# Patient Record
Sex: MALE | Race: WHITE | NOT HISPANIC OR LATINO | URBAN - METROPOLITAN AREA
[De-identification: names, ages, dates, MRNs, and addresses within clinical notes are randomized per-mention and may not be internally consistent; named-entity substitution may affect disease eponyms.]

---

## 2019-05-28 ENCOUNTER — EMERGENCY (EMERGENCY)
Facility: HOSPITAL | Age: 39
LOS: 1 days | Discharge: HOME | End: 2019-05-28
Attending: EMERGENCY MEDICINE | Admitting: EMERGENCY MEDICINE
Payer: COMMERCIAL

## 2019-05-28 VITALS
TEMPERATURE: 98 F | OXYGEN SATURATION: 93 % | SYSTOLIC BLOOD PRESSURE: 135 MMHG | DIASTOLIC BLOOD PRESSURE: 87 MMHG | RESPIRATION RATE: 16 BRPM | HEART RATE: 62 BPM

## 2019-05-28 DIAGNOSIS — M54.5 LOW BACK PAIN: ICD-10-CM

## 2019-05-28 DIAGNOSIS — Z79.1 LONG TERM (CURRENT) USE OF NON-STEROIDAL ANTI-INFLAMMATORIES (NSAID): ICD-10-CM

## 2019-05-28 DIAGNOSIS — Z79.899 OTHER LONG TERM (CURRENT) DRUG THERAPY: ICD-10-CM

## 2019-05-28 DIAGNOSIS — Z79.891 LONG TERM (CURRENT) USE OF OPIATE ANALGESIC: ICD-10-CM

## 2019-05-28 PROCEDURE — 99218: CPT

## 2019-05-28 PROCEDURE — 72131 CT LUMBAR SPINE W/O DYE: CPT | Mod: 26

## 2019-05-28 RX ORDER — OXYCODONE HYDROCHLORIDE 5 MG/1
2 TABLET ORAL
Qty: 40 | Refills: 0
Start: 2019-05-28 | End: 2019-06-01

## 2019-05-28 RX ORDER — DIAZEPAM 5 MG
2 TABLET ORAL
Qty: 20 | Refills: 0
Start: 2019-05-28 | End: 2019-06-01

## 2019-05-28 RX ORDER — HYDROMORPHONE HYDROCHLORIDE 2 MG/ML
1 INJECTION INTRAMUSCULAR; INTRAVENOUS; SUBCUTANEOUS ONCE
Refills: 0 | Status: DISCONTINUED | OUTPATIENT
Start: 2019-05-28 | End: 2019-05-28

## 2019-05-28 RX ORDER — HYDROMORPHONE HYDROCHLORIDE 2 MG/ML
0.5 INJECTION INTRAMUSCULAR; INTRAVENOUS; SUBCUTANEOUS ONCE
Refills: 0 | Status: DISCONTINUED | OUTPATIENT
Start: 2019-05-28 | End: 2019-05-28

## 2019-05-28 RX ORDER — KETOROLAC TROMETHAMINE 30 MG/ML
30 SYRINGE (ML) INJECTION ONCE
Refills: 0 | Status: DISCONTINUED | OUTPATIENT
Start: 2019-05-28 | End: 2019-05-28

## 2019-05-28 RX ORDER — SODIUM CHLORIDE 9 MG/ML
1000 INJECTION INTRAMUSCULAR; INTRAVENOUS; SUBCUTANEOUS ONCE
Refills: 0 | Status: COMPLETED | OUTPATIENT
Start: 2019-05-28 | End: 2019-05-28

## 2019-05-28 RX ORDER — IBUPROFEN 200 MG
1 TABLET ORAL
Qty: 30 | Refills: 0
Start: 2019-05-28

## 2019-05-28 RX ORDER — DIAZEPAM 5 MG
5 TABLET ORAL ONCE
Refills: 0 | Status: DISCONTINUED | OUTPATIENT
Start: 2019-05-28 | End: 2019-05-28

## 2019-05-28 RX ORDER — DEXAMETHASONE 0.5 MG/5ML
10 ELIXIR ORAL ONCE
Refills: 0 | Status: COMPLETED | OUTPATIENT
Start: 2019-05-28 | End: 2019-05-28

## 2019-05-28 RX ORDER — DIPHENHYDRAMINE HCL 50 MG
50 CAPSULE ORAL ONCE
Refills: 0 | Status: COMPLETED | OUTPATIENT
Start: 2019-05-28 | End: 2019-05-28

## 2019-05-28 RX ADMIN — Medication 5 MILLIGRAM(S): at 19:48

## 2019-05-28 RX ADMIN — Medication 50 MILLIGRAM(S): at 17:15

## 2019-05-28 RX ADMIN — Medication 5 MILLIGRAM(S): at 17:15

## 2019-05-28 RX ADMIN — HYDROMORPHONE HYDROCHLORIDE 0.5 MILLIGRAM(S): 2 INJECTION INTRAMUSCULAR; INTRAVENOUS; SUBCUTANEOUS at 19:48

## 2019-05-28 RX ADMIN — HYDROMORPHONE HYDROCHLORIDE 0.5 MILLIGRAM(S): 2 INJECTION INTRAMUSCULAR; INTRAVENOUS; SUBCUTANEOUS at 20:16

## 2019-05-28 RX ADMIN — Medication 10 MILLIGRAM(S): at 17:16

## 2019-05-28 RX ADMIN — SODIUM CHLORIDE 1000 MILLILITER(S): 9 INJECTION INTRAMUSCULAR; INTRAVENOUS; SUBCUTANEOUS at 17:15

## 2019-05-28 RX ADMIN — HYDROMORPHONE HYDROCHLORIDE 1 MILLIGRAM(S): 2 INJECTION INTRAMUSCULAR; INTRAVENOUS; SUBCUTANEOUS at 18:52

## 2019-05-28 RX ADMIN — Medication 30 MILLIGRAM(S): at 17:16

## 2019-05-28 RX ADMIN — HYDROMORPHONE HYDROCHLORIDE 1 MILLIGRAM(S): 2 INJECTION INTRAMUSCULAR; INTRAVENOUS; SUBCUTANEOUS at 17:16

## 2019-05-28 NOTE — ED PROVIDER NOTE - OBJECTIVE STATEMENT
38 y o male with no PMHx was BIBA for lower back pain and stiffness. States he first felt his back twinge while getting up from a chair this morning. However, while sitting on the toilet at work, pt felt his back tighten and lock. Notes difficulty moving secondary to pain. Pt's work friends were able to help him up. States that he had similar back pain 1.5 years ago, but that it was worse then because it happened for the first time. Pt was treated with antiinflammatories and PT since Percocet, Valium, and Dilaudid did not help. Pt took some muscle relaxers with no relief PTA. No numbness, tingling, bladder dysfunction, or other sx.

## 2019-05-28 NOTE — ED PROVIDER NOTE - CONSTITUTIONAL, MLM
normal... Well appearing, well nourished, awake, alert, oriented to person, place, time/situation and lying flat and still on hospital bed.

## 2019-05-28 NOTE — ED PROVIDER NOTE - MUSCULOSKELETAL, MLM
+Back spasm induced with leg extension. +Diffuse lower lumbar soreness and muscle spasm. Pt unable to roll for full spinal assessment..

## 2019-05-28 NOTE — ED CDU PROVIDER INITIAL DAY NOTE - DETAILS
received from Dr. Pedraza.  Pain slightly improved but persists with very limited mobility.  Denies saddle anesthesia or recent change in bowel or bladder continence patterns.  Denies travel history, fever/chills, recent trauma.  Denies radiation of pain or extremity weakness.  motor exam limited due to pain but symmetric strength.  Imaging ordered and will continue to observe and treat pain prn.

## 2019-05-28 NOTE — ED PROVIDER NOTE - CLINICAL SUMMARY MEDICAL DECISION MAKING FREE TEXT BOX
Pt presents with lower back stiffness and pain. Unknown initial aggravation. On exam back spasm induced with leg extension, diffuse lower lumbar soreness and muscle spasm. Pt unable to roll for full spinal assessment. No sensory deficits. Will give Valium, Dilaudid, antiinflammatories, and steroids for symptomatic relief. Reassess.

## 2019-05-28 NOTE — ED PROVIDER NOTE - NSFOLLOWUPINSTRUCTIONS_ED_ALL_ED_FT
Gentle stretching.  Follow up with your PMD.  Dr. Faith at [facility] is an excellent local option for high quality physical medicine and PT.     Return to the ER for increased pain.

## 2019-05-28 NOTE — ED PROVIDER NOTE - PROGRESS NOTE DETAILS
Patient is a little better, wife on her way. Will give another dose of Dilaudid and additional Po Valium. Is now able to move legs side to side. Can't sit up. This is less severe than his episode last year. received from Dr. Pedraza.  Pain slightly improved but persists with very limited mobility.  Denies saddle anesthesia or recent change in bowel or bladder continence patterns.  Denies travel history, fever/chills, recent trauma.  Denies radiation of pain or extremity weakness.  motor exam limited due to pain but symmetric strength.  Imaging ordered and will continue to observe and treat pain prn.

## 2019-05-29 VITALS
SYSTOLIC BLOOD PRESSURE: 124 MMHG | DIASTOLIC BLOOD PRESSURE: 82 MMHG | HEART RATE: 53 BPM | OXYGEN SATURATION: 98 % | TEMPERATURE: 98 F | RESPIRATION RATE: 18 BRPM

## 2019-05-29 LAB
ALBUMIN SERPL ELPH-MCNC: 3.7 G/DL — SIGNIFICANT CHANGE UP (ref 3.4–5)
ALP SERPL-CCNC: 50 U/L — SIGNIFICANT CHANGE UP (ref 40–120)
ALT FLD-CCNC: 44 U/L — HIGH (ref 12–42)
ANION GAP SERPL CALC-SCNC: 7 MMOL/L — LOW (ref 9–16)
AST SERPL-CCNC: 22 U/L — SIGNIFICANT CHANGE UP (ref 15–37)
BILIRUB SERPL-MCNC: 0.6 MG/DL — SIGNIFICANT CHANGE UP (ref 0.2–1.2)
BUN SERPL-MCNC: 14 MG/DL — SIGNIFICANT CHANGE UP (ref 7–23)
CALCIUM SERPL-MCNC: 9.3 MG/DL — SIGNIFICANT CHANGE UP (ref 8.5–10.5)
CHLORIDE SERPL-SCNC: 109 MMOL/L — HIGH (ref 96–108)
CO2 SERPL-SCNC: 28 MMOL/L — SIGNIFICANT CHANGE UP (ref 22–31)
CREAT SERPL-MCNC: 0.87 MG/DL — SIGNIFICANT CHANGE UP (ref 0.5–1.3)
GLUCOSE SERPL-MCNC: 95 MG/DL — SIGNIFICANT CHANGE UP (ref 70–99)
HCT VFR BLD CALC: 44.5 % — SIGNIFICANT CHANGE UP (ref 39–50)
HGB BLD-MCNC: 15 G/DL — SIGNIFICANT CHANGE UP (ref 13–17)
MCHC RBC-ENTMCNC: 26.8 PG — LOW (ref 27–34)
MCHC RBC-ENTMCNC: 33.7 G/DL — SIGNIFICANT CHANGE UP (ref 32–36)
MCV RBC AUTO: 79.6 FL — LOW (ref 80–100)
PLATELET # BLD AUTO: 163 K/UL — SIGNIFICANT CHANGE UP (ref 150–400)
POTASSIUM SERPL-MCNC: 4.5 MMOL/L — SIGNIFICANT CHANGE UP (ref 3.5–5.3)
POTASSIUM SERPL-SCNC: 4.5 MMOL/L — SIGNIFICANT CHANGE UP (ref 3.5–5.3)
PROT SERPL-MCNC: 7.1 G/DL — SIGNIFICANT CHANGE UP (ref 6.4–8.2)
RBC # BLD: 5.59 M/UL — SIGNIFICANT CHANGE UP (ref 4.2–5.8)
RBC # FLD: 12.9 % — SIGNIFICANT CHANGE UP (ref 10.3–14.5)
SODIUM SERPL-SCNC: 144 MMOL/L — SIGNIFICANT CHANGE UP (ref 132–145)
WBC # BLD: 10.7 K/UL — HIGH (ref 3.8–10.5)
WBC # FLD AUTO: 10.7 K/UL — HIGH (ref 3.8–10.5)

## 2019-05-29 PROCEDURE — 99217: CPT

## 2019-05-29 RX ORDER — METHOCARBAMOL 500 MG/1
1000 TABLET, FILM COATED ORAL ONCE
Refills: 0 | Status: COMPLETED | OUTPATIENT
Start: 2019-05-29 | End: 2019-05-29

## 2019-05-29 RX ORDER — KETOROLAC TROMETHAMINE 30 MG/ML
30 SYRINGE (ML) INJECTION ONCE
Refills: 0 | Status: DISCONTINUED | OUTPATIENT
Start: 2019-05-29 | End: 2019-05-29

## 2019-05-29 RX ORDER — IBUPROFEN 200 MG
400 TABLET ORAL ONCE
Refills: 0 | Status: COMPLETED | OUTPATIENT
Start: 2019-05-29 | End: 2019-05-29

## 2019-05-29 RX ORDER — HYDROMORPHONE HYDROCHLORIDE 2 MG/ML
1 INJECTION INTRAMUSCULAR; INTRAVENOUS; SUBCUTANEOUS ONCE
Refills: 0 | Status: DISCONTINUED | OUTPATIENT
Start: 2019-05-29 | End: 2019-05-29

## 2019-05-29 RX ORDER — TRAMADOL HYDROCHLORIDE 50 MG/1
50 TABLET ORAL ONCE
Refills: 0 | Status: DISCONTINUED | OUTPATIENT
Start: 2019-05-29 | End: 2019-05-29

## 2019-05-29 RX ORDER — KETOROLAC TROMETHAMINE 30 MG/ML
15 SYRINGE (ML) INJECTION ONCE
Refills: 0 | Status: DISCONTINUED | OUTPATIENT
Start: 2019-05-29 | End: 2019-05-29

## 2019-05-29 RX ORDER — DIAZEPAM 5 MG
10 TABLET ORAL ONCE
Refills: 0 | Status: DISCONTINUED | OUTPATIENT
Start: 2019-05-29 | End: 2019-05-29

## 2019-05-29 RX ORDER — LIDOCAINE 4 G/100G
1 CREAM TOPICAL ONCE
Refills: 0 | Status: COMPLETED | OUTPATIENT
Start: 2019-05-29 | End: 2019-05-29

## 2019-05-29 RX ORDER — DIPHENHYDRAMINE HCL 50 MG
50 CAPSULE ORAL ONCE
Refills: 0 | Status: COMPLETED | OUTPATIENT
Start: 2019-05-29 | End: 2019-05-29

## 2019-05-29 RX ADMIN — Medication 15 MILLIGRAM(S): at 06:08

## 2019-05-29 RX ADMIN — HYDROMORPHONE HYDROCHLORIDE 1 MILLIGRAM(S): 2 INJECTION INTRAMUSCULAR; INTRAVENOUS; SUBCUTANEOUS at 06:08

## 2019-05-29 RX ADMIN — HYDROMORPHONE HYDROCHLORIDE 1 MILLIGRAM(S): 2 INJECTION INTRAMUSCULAR; INTRAVENOUS; SUBCUTANEOUS at 14:20

## 2019-05-29 RX ADMIN — LIDOCAINE 1 PATCH: 4 CREAM TOPICAL at 06:08

## 2019-05-29 RX ADMIN — Medication 10 MILLIGRAM(S): at 14:19

## 2019-05-29 RX ADMIN — HYDROMORPHONE HYDROCHLORIDE 1 MILLIGRAM(S): 2 INJECTION INTRAMUSCULAR; INTRAVENOUS; SUBCUTANEOUS at 09:58

## 2019-05-29 RX ADMIN — TRAMADOL HYDROCHLORIDE 50 MILLIGRAM(S): 50 TABLET ORAL at 06:09

## 2019-05-29 RX ADMIN — Medication 30 MILLIGRAM(S): at 14:19

## 2019-05-29 RX ADMIN — METHOCARBAMOL 1000 MILLIGRAM(S): 500 TABLET, FILM COATED ORAL at 00:26

## 2019-05-29 RX ADMIN — Medication 15 MILLIGRAM(S): at 00:26

## 2019-05-29 RX ADMIN — Medication 400 MILLIGRAM(S): at 06:09

## 2019-05-29 RX ADMIN — LIDOCAINE 1 PATCH: 4 CREAM TOPICAL at 00:26

## 2019-05-29 RX ADMIN — TRAMADOL HYDROCHLORIDE 50 MILLIGRAM(S): 50 TABLET ORAL at 08:54

## 2019-05-29 RX ADMIN — Medication 30 MILLIGRAM(S): at 06:08

## 2019-05-29 RX ADMIN — Medication 400 MILLIGRAM(S): at 08:54

## 2019-05-29 RX ADMIN — Medication 50 MILLIGRAM(S): at 14:20

## 2019-05-29 NOTE — ED CDU PROVIDER DISPOSITION NOTE - NSFOLLOWUPINSTRUCTIONS_ED_ALL_ED_FT
Rest, gentle stretching.     Please follow up with a Sports MD or physiatrist for reassessment and physical therapy.    Dr. Faith at [facility]. As is Dr. Martinez (a spine neurologist).     Return to the ER for increased pain.

## 2019-05-29 NOTE — ED CDU PROVIDER SUBSEQUENT DAY NOTE - PROGRESS NOTE DETAILS
Imaging obtained and unremarkable except for disc bulge at L5-S1 which is in the same location the patient is having pain.  Additional pain medication ordered.  Patient requesting to stay until the morning.  Resting in the gurney comfortably with his wife at the bedside.  Will sign out to Dr. Pedraza at shift change. Patient placed on obs for severe back pain/spasm. Had CT overnight that showed L5/S1 disc bulge. Wife at bedside. Walker provided. they would like to try to go home. Patient is quite a b8it better, still can't sit in a wheelchair or walk more than a few steps. Getting another round of strong meds. Labs sent incase he needs admisison. Patient is quite a b8it better, still can't sit in a wheelchair or walk more than a few steps. Getting another round of strong meds. Also gently stretching back. Labs sent incase he needs admission. Up and ambulatory- slowly. Will d/c. Brother is taking him home.

## 2019-05-29 NOTE — ED CDU PROVIDER DISPOSITION NOTE - CARE PROVIDER_API CALL
Mary Beth Michaels)  Neurology  [address] [place] [address]  Phone: [phone]  Fax: [phone]  Follow Up Time:

## 2019-05-29 NOTE — ED CDU PROVIDER SUBSEQUENT DAY NOTE - MEDICAL DECISION MAKING DETAILS
Patient placed on obs for severe back pain/spasm. Had CT overnight that showed L5/S1 disc bulge. Wife at bedside. Needed many does of pain meds/narcotics/NSAIDS/Muslce relaxants. Walker provided. They would like to try to go home.

## 2019-05-29 NOTE — ED CDU PROVIDER DISPOSITION NOTE - CLINICAL COURSE
Patient placed on obs for severe back spasm and pain. Required multiple rounds o IV Dilaudid, Valium, NSAIDS and steroids/Benadryl. eventually was able to get up/walk. D/c'd with brother.

## 2019-05-29 NOTE — ED CDU PROVIDER SUBSEQUENT DAY NOTE - HISTORY
Patient place don obs for severe back pain/spasm. Had CT overnight that showed L5/S1 disc bulge. Wife at bedside. Walker provided. they would like to try to go home. Patient placed on obs for severe back pain/spasm. Had CT overnight that showed L5/S1 disc bulge. Wife at bedside. Walker provided. they would like to try to go home.

## 2019-05-29 NOTE — ED ADULT NURSE REASSESSMENT NOTE - NS ED NURSE REASSESS COMMENT FT1
received pt from night shift RN. pt appears comfortable laying on bed, wife at bedside, and will continue to monitor.
received pt from JIM Knapp. pt awaiting disposition at this time, still c/o decreased mobility and pain.

## 2025-01-03 NOTE — ED CDU PROVIDER INITIAL DAY NOTE - PSH
Upon review of the In Basket request we were able to locate, review, and update the patient chart as requested for Diabetic Eye Exam. 5-27-23    Any additional questions or concerns should be emailed to the Practice Liaisons via the appropriate education email address, please do not reply via In Basket.    Thank you  Antonia Delaney MA   [facility]           No significant past surgical history